# Patient Record
Sex: FEMALE | Race: WHITE
[De-identification: names, ages, dates, MRNs, and addresses within clinical notes are randomized per-mention and may not be internally consistent; named-entity substitution may affect disease eponyms.]

---

## 2017-12-02 ENCOUNTER — HOSPITAL ENCOUNTER (EMERGENCY)
Dept: HOSPITAL 62 - ER | Age: 22
LOS: 1 days | Discharge: HOME | End: 2017-12-03
Payer: OTHER GOVERNMENT
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
## 2017-12-02 DIAGNOSIS — F17.200: ICD-10-CM
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
## 2017-12-02 DIAGNOSIS — S61.210A: Primary | ICD-10-CM

## 2017-12-02 PROCEDURE — 99283 EMERGENCY DEPT VISIT LOW MDM: CPT

## 2017-12-03 VITALS — SYSTOLIC BLOOD PRESSURE: 118 MMHG | DIASTOLIC BLOOD PRESSURE: 72 MMHG

## 2017-12-03 NOTE — ER DOCUMENT REPORT
ED Wound





- General


Chief Complaint: Laceration


Stated Complaint: FINGER LACERATION


Time Seen by Provider: 12/03/17 02:08


Notes: 


Patient is a 22 year old female that comes emergency department for chief 

complaint of laceration to the right index finger.  She states that she was 

washing dishes and accidentally cut herself with a knife.  She denies any other 

injuries. She states her tetanus is UTD within 5 years. 





TRAVEL OUTSIDE OF THE [place] IN LAST 30 DAYS: No





- Related Data


Allergies/Adverse Reactions: 


 





No Known Allergies Allergy (Verified 12/03/17 00:31)


 











Past Medical History





- General


Information source: Patient





- Social History


Smoking Status: Current Some Day Smoker


Chew tobacco use (# tins/day): No


Frequency of alcohol use: Heavy


Drug Abuse: None


Lives with: Family


Family History: None - renal calculi


Patient has suicidal ideation: No


Patient has homicidal ideation: No





- Medical History


Medical History: Negative


Renal/ Medical History: Denies: Hx Peritoneal Dialysis


Surgical Hx: Negative





- Immunizations


Immunizations up to date: Yes


Hx Diphtheria, Pertussis, Tetanus Vaccination: Yes





Review of Systems





- Review of Systems


Constitutional: No symptoms reported


EENT: No symptoms reported


Cardiovascular: No symptoms reported


Respiratory: No symptoms reported


Gastrointestinal: No symptoms reported


Genitourinary: No symptoms reported


Female Genitourinary: No symptoms reported


Musculoskeletal: See HPI


Skin: See HPI


Hematologic/Lymphatic: No symptoms reported


Neurological/Psychological: No symptoms reported





Physical Exam





- Vital signs


Vitals: 


 











Temp Pulse Resp BP Pulse Ox


 


 97.7 F   102 H  16   118/72   100 


 


 12/03/17 01:37  12/03/17 01:37  12/03/17 01:37  12/03/17 01:37  12/03/17 01:37











Interpretation: Normal





- General


General appearance: Appears well, Alert


In distress: None





- HEENT


Head: Normocephalic, Atraumatic


Eyes: Normal


Pupils: PERRL





- Respiratory


Respiratory status: No respiratory distress


Chest status: Nontender


Breath sounds: Normal


Chest palpation: Normal





- Cardiovascular


Rhythm: Regular


Heart sounds: Normal auscultation


Murmur: No





- Abdominal


Inspection: Normal


Distension: No distension


Bowel sounds: Normal


Tenderness: Nontender


Organomegaly: No organomegaly





- Back


Back: Normal, Nontender





- Extremities


General upper extremity: Other - There is a 1.5 cm linear resolve the 

laceration over the side of the right index finger laterally, just before the 

DIP, normal range of motion of the finger, normal sensation and capillary 

refill.  Partial-thickness wound with no deep cut or evidence of tendon or 

nerve laceration


General lower extremity: Normal inspection, Nontender, Normal color, Normal ROM

, Normal temperature, Normal weight bearing





- Neurological


Neuro grossly intact: Yes


Cognition: Normal


Orientation: AAOx4


[place]rin Coma Scale Eye Opening: Spontaneous


[place] Coma Scale Verbal: Oriented


[place] Coma Scale Motor: Obeys Commands


[place] Coma Scale Total: 15


Speech: Normal


Motor strength normal: LUE, RUE, LLE, RLE


Sensory: Normal





- Psychological


Associated symptoms: Normal affect, Normal mood





- Skin


Skin Temperature: Warm


Skin Moisture: Dry


Skin Color: Normal





Course





- Re-evaluation


Re-evalutation: 


Laceration to the right index finger irrigated thoroughly, sutured, dressed.  

Discussed wound care, follow-up, return precautions.  Patient and  state 

understanding and agreement.








- Vital Signs


Vital signs: 


 











Temp Pulse Resp BP Pulse Ox


 


 97.7 F   102 H  16   118/72   100 


 


 12/03/17 01:37  12/03/17 01:37  12/03/17 01:37  12/03/17 01:37  12/03/17 01:37














Procedures





- Laceration/Wound Repair


  ** right index finger


Wound length (cm): 1.5


Wound's Depth, Shape: Irregular


Laceration pre-procedure: Sterile PPE donned, Sterile drapes applied, Shur-

Clens applied - surgical cleanser


Anesthetic type: 1% Lidocaine


Volume Anesthetic (mLs): 2


Wound explored: Clean, No foreign body removed


Irrigated w/ Saline (mLs): 40


Wound Repaired With: Sutures


Suture Size/Type: 5:0, Prolene


Number of Sutures: 5


Layer Closure?: No


Post-procedure wound care: Sterile dressing applied


Post-procedure NV exam normal: Yes


Complications: No





Discharge





- Discharge


Clinical Impression: 


Finger laceration


Qualifiers:


 Encounter type: initial encounter Finger: index finger Damage to nail status: 

without damage Foreign body presence: without foreign body Laterality: right 

Qualified Code(s): S61.210A - Laceration without foreign body of right index 

finger without damage to nail, initial encounter





Condition: Stable


Disposition: HOME, SELF-CARE


Additional Instructions: 


Sutures need to come out in about 7 days.  Keep clean, clean with soap and water

, dab dry, avoid soaking, you can apply thin film topical antibiotic and clean 

dressing.  Change about daily, more if it becomes dirty.





Return for any concerning symptoms including redness, swelling, discolored 

drainage, or any other concerning symptoms.

## 2018-04-05 ENCOUNTER — HOSPITAL ENCOUNTER (EMERGENCY)
Dept: HOSPITAL 62 - ER | Age: 23
LOS: 1 days | Discharge: HOME | End: 2018-04-06
Payer: OTHER GOVERNMENT

## 2018-04-05 DIAGNOSIS — R10.9: Primary | ICD-10-CM

## 2018-04-05 DIAGNOSIS — R10.31: ICD-10-CM

## 2018-04-05 DIAGNOSIS — Z87.442: ICD-10-CM

## 2018-04-05 PROCEDURE — 81001 URINALYSIS AUTO W/SCOPE: CPT

## 2018-04-05 PROCEDURE — 81025 URINE PREGNANCY TEST: CPT

## 2018-04-05 PROCEDURE — 85025 COMPLETE CBC W/AUTO DIFF WBC: CPT

## 2018-04-05 PROCEDURE — 74018 RADEX ABDOMEN 1 VIEW: CPT

## 2018-04-05 PROCEDURE — 96374 THER/PROPH/DIAG INJ IV PUSH: CPT

## 2018-04-05 PROCEDURE — 96361 HYDRATE IV INFUSION ADD-ON: CPT

## 2018-04-05 PROCEDURE — 96375 TX/PRO/DX INJ NEW DRUG ADDON: CPT

## 2018-04-05 PROCEDURE — 80053 COMPREHEN METABOLIC PANEL: CPT

## 2018-04-05 PROCEDURE — 36415 COLL VENOUS BLD VENIPUNCTURE: CPT

## 2018-04-05 PROCEDURE — 99284 EMERGENCY DEPT VISIT MOD MDM: CPT

## 2018-04-06 VITALS — SYSTOLIC BLOOD PRESSURE: 120 MMHG | DIASTOLIC BLOOD PRESSURE: 76 MMHG

## 2018-04-06 LAB
ADD MANUAL DIFF: NO
ALBUMIN SERPL-MCNC: 4.9 G/DL (ref 3.5–5)
ALP SERPL-CCNC: 47 U/L (ref 38–126)
ALT SERPL-CCNC: 37 U/L (ref 9–52)
ANION GAP SERPL CALC-SCNC: 13 MMOL/L (ref 5–19)
APPEARANCE UR: CLEAR
APTT PPP: (no result) S
AST SERPL-CCNC: 26 U/L (ref 14–36)
BASOPHILS # BLD AUTO: 0 10^3/UL (ref 0–0.2)
BASOPHILS NFR BLD AUTO: 0.5 % (ref 0–2)
BILIRUB DIRECT SERPL-MCNC: 0.3 MG/DL (ref 0–0.4)
BILIRUB SERPL-MCNC: 0.5 MG/DL (ref 0.2–1.3)
BILIRUB UR QL STRIP: NEGATIVE
BUN SERPL-MCNC: 18 MG/DL (ref 7–20)
CALCIUM: 10.5 MG/DL (ref 8.4–10.2)
CHLORIDE SERPL-SCNC: 102 MMOL/L (ref 98–107)
CO2 SERPL-SCNC: 27 MMOL/L (ref 22–30)
EOSINOPHIL # BLD AUTO: 0.1 10^3/UL (ref 0–0.6)
EOSINOPHIL NFR BLD AUTO: 1 % (ref 0–6)
ERYTHROCYTE [DISTWIDTH] IN BLOOD BY AUTOMATED COUNT: 12.9 % (ref 11.5–14)
GLUCOSE SERPL-MCNC: 88 MG/DL (ref 75–110)
GLUCOSE UR STRIP-MCNC: NEGATIVE MG/DL
HCT VFR BLD CALC: 39.2 % (ref 36–47)
HGB BLD-MCNC: 13.5 G/DL (ref 12–15.5)
KETONES UR STRIP-MCNC: NEGATIVE MG/DL
LYMPHOCYTES # BLD AUTO: 2.6 10^3/UL (ref 0.5–4.7)
LYMPHOCYTES NFR BLD AUTO: 29.9 % (ref 13–45)
MCH RBC QN AUTO: 32.5 PG (ref 27–33.4)
MCHC RBC AUTO-ENTMCNC: 34.6 G/DL (ref 32–36)
MCV RBC AUTO: 94 FL (ref 80–97)
MONOCYTES # BLD AUTO: 0.7 10^3/UL (ref 0.1–1.4)
MONOCYTES NFR BLD AUTO: 7.9 % (ref 3–13)
NEUTROPHILS # BLD AUTO: 5.3 10^3/UL (ref 1.7–8.2)
NEUTS SEG NFR BLD AUTO: 60.7 % (ref 42–78)
NITRITE UR QL STRIP: NEGATIVE
PH UR STRIP: 6 [PH] (ref 5–9)
PLATELET # BLD: 309 10^3/UL (ref 150–450)
POTASSIUM SERPL-SCNC: 4.2 MMOL/L (ref 3.6–5)
PROT SERPL-MCNC: 7.6 G/DL (ref 6.3–8.2)
PROT UR STRIP-MCNC: NEGATIVE MG/DL
RBC # BLD AUTO: 4.16 10^6/UL (ref 3.72–5.28)
SODIUM SERPL-SCNC: 142.1 MMOL/L (ref 137–145)
SP GR UR STRIP: 1.01
TOTAL CELLS COUNTED % (AUTO): 100 %
UROBILINOGEN UR-MCNC: NEGATIVE MG/DL (ref ?–2)
WBC # BLD AUTO: 8.8 10^3/UL (ref 4–10.5)

## 2018-04-06 NOTE — RADIOLOGY REPORT (SQ)
EXAM DESCRIPTION: KUB/ABDOMEN (SINGLE VIEW)



CLINICAL HISTORY:  right flank and abd pain; passing kidney stone



COMPARISON: 12/21/2016



FINDINGS: 



Bowel: No dilated loops of large or small bowel.

Peritoneum: No free intraperitoneal air identified.

Solid organs: No definite organomegaly.

Calcifications: No abnormal calcifications.

Bones: No acute osseous abnormalities.



Other: No additional findings.





IMPRESSION:

Nonobstructive bowel gas pattern. No abnormal calcifications

identified.

## 2018-04-06 NOTE — ER DOCUMENT REPORT
ED GI/





- General


Chief Complaint: Possible Kidney Stone


Stated Complaint: BACK PAIN


Time Seen by Provider: 04/06/18 02:12


Notes: 





Patient is a 22-year-old female that comes emergency department for chief 

complaint of pain in her right flank that radiates around to her right mid to 

lower abdomen, symptoms started earlier today, she states the pain in her flank 

is been constant but the pain in her lower abdomen is intermittent and sharp.  

She states she had nausea earlier, she denies vomiting, fever, dysuria.  She 

has a history of kidney stones, states this feels like she is passing one.  She 

denies any surgeries or daily medications, LMP 1 week ago.  She denies any 

other medical history.


TRAVEL OUTSIDE OF THE [place] IN LAST 30 DAYS: No





- Related Data


Allergies/Adverse Reactions: 


 





No Known Allergies Allergy (Verified 12/03/17 00:31)


 











Past Medical History





- General


Information source: Patient





- Social History


Smoking Status: Never Smoker


Frequency of alcohol use: None


Drug Abuse: None


Lives with: Family


Family History: None - renal calculi


Renal/ Medical History: Reports: Hx Kidney Stones.  Denies: Hx Peritoneal 

Dialysis


Surgical Hx: Negative





- Immunizations


Immunizations up to date: Yes


Hx Diphtheria, Pertussis, Tetanus Vaccination: Yes





Review of Systems





- Review of Systems


Constitutional: No symptoms reported


EENT: No symptoms reported


Cardiovascular: No symptoms reported


Respiratory: No symptoms reported


Gastrointestinal: See HPI


Genitourinary: See HPI


Female Genitourinary: No symptoms reported


Musculoskeletal: No symptoms reported


Skin: No symptoms reported


Hematologic/Lymphatic: No symptoms reported


Neurological/Psychological: No symptoms reported





Physical Exam





- Vital signs


Vitals: 


 











Temp Pulse Resp BP Pulse Ox


 


 98.2 F   114 H  18   134/82 H  98 


 


 04/05/18 22:57  04/05/18 22:57  04/05/18 22:57  04/05/18 22:57  04/05/18 22:57











Interpretation: Normal





- General


General appearance: Alert, Anxious


In distress: Mild - Patient appears somewhat uncomfortable





- HEENT


Head: Normocephalic, Atraumatic


Eyes: Normal


Pupils: PERRL





- Respiratory


Respiratory status: No respiratory distress


Chest status: Nontender


Breath sounds: Normal


Chest palpation: Normal





- Cardiovascular


Rhythm: Regular


Heart sounds: Normal auscultation


Murmur: No





- Abdominal


Inspection: Normal


Distension: No distension


Bowel sounds: Normal


Tenderness: Tender - There is mild mid to lower right abdominal tenderness, no 

guarding, no rigidity, no rebound tenderness


Organomegaly: No organomegaly





- Back


Back: Normal, Nontender





- Extremities


General upper extremity: Normal inspection, Nontender, Normal color, Normal ROM

, Normal temperature


General lower extremity: Normal inspection, Nontender, Normal color, Normal ROM

, Normal temperature, Normal weight bearing.  No: Barbara's sign





- Neurological


Neuro grossly intact: Yes


Cognition: Normal


Orientation: AAOx4


[place] Coma Scale Eye Opening: Spontaneous


[place] Coma Scale Verbal: Oriented


[place] Coma Scale Motor: Obeys Commands


[place] Coma Scale Total: 15


Speech: Normal


Motor strength normal: LUE, RUE, LLE, RLE


Sensory: Normal





- Psychological


Associated symptoms: Normal affect, Normal mood





- Skin


Skin Temperature: Warm


Skin Moisture: Dry


Skin Color: Normal





Course





- Re-evaluation


Re-evalutation: 


Patient does have some generalized right mid to lower abdominal pain on exam, 

no guarding, no obvious CVA tenderness.  Patient states her pain is in her 

right flank and intermittently in the right abdomen.  Suspicious for kidney 

stone.





KUB unremarkable.  This was performed after discussion with patient, she did 

decline a CAT scan because of the radiation.  She understands that this is not 

a thorough exam.  CBC, chemistry unremarkable, urinalysis unremarkable 

including no hematuria or infection.





Discussed with patient.  Her symptoms do suggest passage of a small stone, 

possibly not able to visualize on KUB.  Discussed possibility of appendicitis 

versus other acute intra-abdominal abnormalities, additional workup was 

declined.  Plan is to treat patient for suspected small stone with strict 

return precautions for development of appendicitis symptoms.  Patient and 

significant other state understanding and agreement with plan.





- Vital Signs


Vital signs: 


 











Temp Pulse Resp BP Pulse Ox


 


 98.9 F   91   16   120/76   98 


 


 04/06/18 04:43  04/06/18 04:43  04/06/18 04:43  04/06/18 04:43  04/06/18 04:43














- Laboratory


Result Diagrams: 


 04/06/18 02:39





 04/06/18 02:39


Laboratory results interpreted by me: 


 











  04/06/18 04/06/18





  01:00 02:39


 


Calcium   10.5 H


 


Urine Blood  SMALL H 














Discharge





- Discharge


Clinical Impression: 


 Lower abdominal pain, Right flank pain





Condition: Stable


Disposition: HOME, SELF-CARE


Additional Instructions: 


Your x-ray does not show any obvious stones or abnormalities.  Your workup does 

not show any obvious abnormalities.  Your examination and history are most 

suggestive of the passing a small stone on the right side, however this was not 

confirmed.  The passage of time should help confirm this.  If you worsen 

including developing pain specifically in your lower abdomen, vomiting, chills, 

swelling of the abdomen, or any other concerning or worsening symptoms please 

return to the emergency department immediately.  Otherwise take the medications 

as prescribed and follow-up with urology routinely.


Prescriptions: 


Ketorolac Tromethamine [Toradol 10 mg Tablet] 10 mg PO Q8HP PRN #24 tablet


 PRN Reason: 


Hydrocodone/Acetaminophen [Norco 5-325 mg Tablet] 1 - 2 tab PO ASDIR #12 tablet


Ondansetron [Zofran Odt 4 mg Tablet] 1 - 2 tab PO Q4H PRN #15 tab.rapdis


 PRN Reason: For Nausea/Vomiting

## 2018-12-09 ENCOUNTER — HOSPITAL ENCOUNTER (EMERGENCY)
Dept: HOSPITAL 62 - ER | Age: 23
Discharge: HOME | End: 2018-12-09
Payer: OTHER GOVERNMENT

## 2018-12-09 VITALS — DIASTOLIC BLOOD PRESSURE: 85 MMHG | SYSTOLIC BLOOD PRESSURE: 122 MMHG

## 2018-12-09 DIAGNOSIS — F17.200: ICD-10-CM

## 2018-12-09 DIAGNOSIS — R10.9: Primary | ICD-10-CM

## 2018-12-09 DIAGNOSIS — G89.29: ICD-10-CM

## 2018-12-09 LAB
ANION GAP SERPL CALC-SCNC: 11 MMOL/L (ref 5–19)
APPEARANCE UR: (no result)
APTT PPP: YELLOW S
BILIRUB UR QL STRIP: NEGATIVE
BUN SERPL-MCNC: 13 MG/DL (ref 7–20)
CALCIUM: 9 MG/DL (ref 8.4–10.2)
CHLORIDE SERPL-SCNC: 105 MMOL/L (ref 98–107)
CO2 SERPL-SCNC: 25 MMOL/L (ref 22–30)
GLUCOSE SERPL-MCNC: 89 MG/DL (ref 75–110)
GLUCOSE UR STRIP-MCNC: NEGATIVE MG/DL
KETONES UR STRIP-MCNC: NEGATIVE MG/DL
NITRITE UR QL STRIP: NEGATIVE
PH UR STRIP: 7 [PH] (ref 5–9)
POTASSIUM SERPL-SCNC: 4.7 MMOL/L (ref 3.6–5)
PROT UR STRIP-MCNC: NEGATIVE MG/DL
SODIUM SERPL-SCNC: 140.5 MMOL/L (ref 137–145)
SP GR UR STRIP: 1.02
UROBILINOGEN UR-MCNC: NEGATIVE MG/DL (ref ?–2)

## 2018-12-09 PROCEDURE — 81001 URINALYSIS AUTO W/SCOPE: CPT

## 2018-12-09 PROCEDURE — 99284 EMERGENCY DEPT VISIT MOD MDM: CPT

## 2018-12-09 PROCEDURE — 96374 THER/PROPH/DIAG INJ IV PUSH: CPT

## 2018-12-09 PROCEDURE — 74176 CT ABD & PELVIS W/O CONTRAST: CPT

## 2018-12-09 PROCEDURE — 96361 HYDRATE IV INFUSION ADD-ON: CPT

## 2018-12-09 PROCEDURE — 84703 CHORIONIC GONADOTROPIN ASSAY: CPT

## 2018-12-09 PROCEDURE — 80048 BASIC METABOLIC PNL TOTAL CA: CPT

## 2018-12-09 PROCEDURE — 36415 COLL VENOUS BLD VENIPUNCTURE: CPT

## 2018-12-09 PROCEDURE — 96375 TX/PRO/DX INJ NEW DRUG ADDON: CPT

## 2018-12-09 RX ADMIN — MORPHINE SULFATE PRN MG: 10 INJECTION INTRAMUSCULAR; INTRAVENOUS; SUBCUTANEOUS at 19:09

## 2018-12-09 RX ADMIN — MORPHINE SULFATE PRN MG: 10 INJECTION INTRAMUSCULAR; INTRAVENOUS; SUBCUTANEOUS at 21:10

## 2018-12-09 NOTE — ER DOCUMENT REPORT
ED General





- General


Chief Complaint: Possible Kidney Stone


Stated Complaint: FLANK PAIN


Time Seen by Provider: 12/09/18 18:35


Notes: 





Patient is a 23-year-old female with chronic medical history of anxiety, 

recurrent right flank pain who presents with approximately 12 hours of right 

flank pain.  She states it is a throbbing, aching, constant pain worsened by 

standing for prolonged periods of time.  She states only and that relieves it 

is laying flat in a bed.  She states that she gets this pain naproxen once 

every 2 weeks usually after standing for prolonged period of time or 

exercising.  She reports a history of kidney stones although has never 

witnessed passage of a stone, has never had a CT of her abdomen and pelvis, has 

no known confirmation of having had a kidney stone definitively in the past.  

She denies any associated vomiting, fever or constitutional symptoms.  No 

abdominal pain.  Nothing improves her symptoms.  She has not contacted her 

general physician regarding today's concerns.





- Related Data


Allergies/Adverse Reactions: 


 





No Known Allergies Allergy (Verified 12/03/17 00:31)


 











Past Medical History





- General


Information source: Patient





- Social History


Smoking Status: Current Every Day Smoker


Chew tobacco use (# tins/day): No


Frequency of alcohol use: Social


Drug Abuse: None


Lives with: Spouse/Significant other


Family History: Reviewed & Not Pertinent


Patient has suicidal ideation: No


Patient has homicidal ideation: No


Renal/ Medical History: Reports: Hx Kidney Stones.  Denies: Hx Peritoneal 

Dialysis





- Immunizations


Immunizations up to date: Yes


Hx Diphtheria, Pertussis, Tetanus Vaccination: Yes





Review of Systems





- Review of Systems


Notes: 





Constitutional: Negative for fever.


HENT: Negative for sore throat.


Eyes: Negative for visual changes.


Cardiovascular: Negative for chest pain.


Respiratory: Negative for shortness of breath.


Gastrointestinal: Positive for right flank pain and nausea


Genitourinary: Negative for dysuria


Musculoskeletal: Negative for back pain.


Skin: Negative for rash.


Neurological: Negative for headaches, weakness or numbness.





10 point ROS negative except as marked above and in HPI.





Physical Exam





- Vital signs


Vitals: 


 











Temp Pulse Resp BP Pulse Ox


 


 99.7 F   87   18   123/81   98 


 


 12/09/18 18:02  12/09/18 18:02  12/09/18 18:02  12/09/18 18:02  12/09/18 18:02











Interpretation: Normal


Notes: 





PHYSICAL EXAMINATION:





GENERAL: Well-appearing, well-nourished and in no acute distress.





HEAD: Atraumatic, normocephalic.





EYES: Pupils equal round and reactive to light, extraocular movements intact, 

sclera anicteric, conjunctiva are normal.





ENT: nares patent, oropharynx clear without exudates.  Moist mucous membranes.





NECK: Normal range of motion, supple without lymphadenopathy





LUNGS: Breath sounds clear to auscultation bilaterally and equal.  No wheezes 

rales or rhonchi.





HEART: Regular rate and rhythm without murmurs





ABDOMEN: Soft, mild right flank tenderness to palpation otherwise no abdominal 

or flank tenderness on palpation, normoactive bowel sounds.  No guarding, no 

rebound.  No masses appreciated.





EXTREMITIES: Normal range of motion, no pitting or edema.  No cyanosis.





NEUROLOGICAL: No focal neurological deficits. Moves all extremities 

spontaneously and on command.





PSYCH: Normal mood, normal affect.





SKIN: Warm, Dry, normal turgor, no rashes or lesions noted.





Course





- Re-evaluation


Re-evalutation: 





12/09/18 19:51


Patient presents with chronic recurrent right flank pain.  On exam she only has 

localized right flank tenderness on palpation but no other abdominal 

tenderness.  The patient reports a history of kidney stones based on 2 previous 

visits to the emergency department but of note on these occasions the patient 

never had hematuria and only had a questionable calcification on initial KUB in 

2016.  The recurrent nature of her symptoms as well as her characterization of 

the pain, the fact that lying still in a bed improves her symptoms all bring 

significant questions about the validity of the diagnosis of nephrolithiasis.  

We have therefore elected to proceed with CT scan of the abdomen and pelvis to 

further clarify.  I do suspect that this is more likely musculoskeletal in 

origin.  I have expressed this to the patient and her  and we have 

agreed that if the CT is normal, labs are unremarkable that she will require 

outpatient evaluation for her ongoing recurrent right flank pain.


12/09/18 20:12


CT is normal without any evidence of kidney stone or other acute pathology.  

All labs unremarkable.  Patient symptomatically improved.  Repeat abdominal 

exam remains benign.  At this time will discharge with return precautions and 

follow-up recommendations.  Verbal discharge instructions given a the bedside 

and opportunity for questions given. Medication warnings reviewed. Patient is 

in agreement with this plan and has verbalized understanding of return 

precautions and the need for primary care follow-up in the next 24-72 hours.





- Vital Signs


Vital signs: 


 











Temp Pulse Resp BP Pulse Ox


 


 99.7 F   87   18   123/81   98 


 


 12/09/18 18:02  12/09/18 18:02  12/09/18 18:02  12/09/18 18:02  12/09/18 18:02














- Laboratory


Result Diagrams: 


 12/09/18 19:00


Laboratory results interpreted by me: 


 











  12/09/18





  19:00


 


Urine Blood  SMALL H


 


Ur Leukocyte Esterase  LARGE H














- Diagnostic Test


Radiology reviewed: Reports reviewed





Discharge





- Discharge


Clinical Impression: 


 Right flank pain, Chronic flank pain





Condition: Good


Disposition: HOME, SELF-CARE


Additional Instructions: 


Your CT scan and labs are normal today. The exact cause of your recurrent flank 

pain is uncertain but does appear to be most likely related to the muscles of 

your back. You do not have kidney stones or evidence of a kidney infection.  

For your pain: Take ibuprofen 600 mg and acetaminophen 1000 mg every 6 hours 

together as needed for pain.  Apply heat to the area regularly. Follow-up with 

your general doctor for consideration of PT referral. Return if you develop 

fever, vomiting, pass out, or have any other symptoms that are concerning to 

you.

## 2018-12-09 NOTE — RADIOLOGY REPORT (SQ)
EXAM DESCRIPTION:  CT ABD/PELVIS NO ORAL OR IV



COMPLETED DATE/TIME:  12/9/2018 7:57 pm



REASON FOR STUDY:  right flank pain



COMPARISON:  None.



TECHNIQUE:  CT scan of the abdomen and pelvis performed without intravenous or oral contrast. Images 
reviewed with lung, soft tissue, and bone windows. Reconstructed coronal and sagittal MPR images revi
ewed. All images stored on PACS.

All CT scanners at this facility use dose modulation, iterative reconstruction, and/or weight based d
osing when appropriate to reduce radiation dose to as low as reasonably achievable (ALARA).

[facility]: Dose Right  [facility]: CareDose    [facility]: Dose Right    [facility]: Teradose 4D    [facility]: Smart [employer]



RADIATION DOSE:  CT Rad equipment meets quality standard of care and radiation dose reduction techniq
ues were employed. CTDIvol: 9.1 mGy. DLP: 477 mGy-cm.mGy.



LIMITATIONS:  None.



FINDINGS:  LOWER CHEST: No significant findings. No nodules or infiltrates.

NON-CONTRASTED LIVER, SPLEEN, ADRENALS: Evaluation limited by lack of IV contrast. No identified sign
ificant masses.

PANCREAS: No masses. No peripancreatic inflammatory changes.

GALLBLADDER: No identified stones by CT criteria. No inflammatory changes to suggest cholecystitis.

RIGHT KIDNEY AND URETER: No suspicious masses. Assessment limited by lack of IV contrast.   No signif
icant calcifications.   No hydronephrosis or hydroureter.

LEFT KIDNEY AND URETER: No suspicious masses. Assessment limited by lack of IV contrast.   No signifi
cant calcifications.   No hydronephrosis or hydroureter.

AORTA AND RETROPERITONEUM: No aneurysm. No retroperitoneal masses or adenopathy.

BOWEL AND PERITONEAL CAVITY: No obvious masses or inflammatory changes. No free fluid.

APPENDIX: Normal.

PELVIS, BLADDER, AND ABDOMINAL WALL:No abnormal masses. No free fluid. Bladder normal.

BONES: No significant findings.

OTHER: No other significant finding.



IMPRESSION:  NO SIGNIFICANT OR ACUTE PROCESS IN THE ABDOMEN OR PELVIS.



COMMENT:  Quality ID # [id number]: Final reports with documentation of one or more dose reduction techniques
 (e.g., Automated exposure control, adjustment of the mA and/or kV according to patient size, use of 
iterative reconstruction technique)



TECHNICAL DOCUMENTATION:  JOB ID:  [id number]

 2011 [employer]- All Rights Reserved



Reading location - IP/workstation name: [email or web address]

## 2018-12-24 ENCOUNTER — HOSPITAL ENCOUNTER (EMERGENCY)
Dept: HOSPITAL 62 - ER | Age: 23
LOS: 1 days | Discharge: HOME | End: 2018-12-25
Payer: OTHER GOVERNMENT

## 2018-12-24 DIAGNOSIS — R39.11: ICD-10-CM

## 2018-12-24 DIAGNOSIS — R31.9: ICD-10-CM

## 2018-12-24 DIAGNOSIS — Z87.442: ICD-10-CM

## 2018-12-24 DIAGNOSIS — R10.9: Primary | ICD-10-CM

## 2018-12-24 DIAGNOSIS — F17.200: ICD-10-CM

## 2018-12-24 LAB
ADD MANUAL DIFF: NO
ALBUMIN SERPL-MCNC: 4.7 G/DL (ref 3.5–5)
ALP SERPL-CCNC: 81 U/L (ref 38–126)
ALT SERPL-CCNC: 21 U/L (ref 9–52)
ANION GAP SERPL CALC-SCNC: 13 MMOL/L (ref 5–19)
AST SERPL-CCNC: 26 U/L (ref 14–36)
BASOPHILS # BLD AUTO: 0.1 10^3/UL (ref 0–0.2)
BASOPHILS NFR BLD AUTO: 0.6 % (ref 0–2)
BILIRUB DIRECT SERPL-MCNC: 0.2 MG/DL (ref 0–0.4)
BILIRUB SERPL-MCNC: 0.2 MG/DL (ref 0.2–1.3)
BUN SERPL-MCNC: 13 MG/DL (ref 7–20)
CALCIUM: 9.8 MG/DL (ref 8.4–10.2)
CHLORIDE SERPL-SCNC: 107 MMOL/L (ref 98–107)
CO2 SERPL-SCNC: 23 MMOL/L (ref 22–30)
EOSINOPHIL # BLD AUTO: 0 10^3/UL (ref 0–0.6)
EOSINOPHIL NFR BLD AUTO: 0.3 % (ref 0–6)
ERYTHROCYTE [DISTWIDTH] IN BLOOD BY AUTOMATED COUNT: 13.5 % (ref 11.5–14)
GLUCOSE SERPL-MCNC: 118 MG/DL (ref 75–110)
HCT VFR BLD CALC: 41.2 % (ref 36–47)
HGB BLD-MCNC: 14.2 G/DL (ref 12–15.5)
LYMPHOCYTES # BLD AUTO: 2.5 10^3/UL (ref 0.5–4.7)
LYMPHOCYTES NFR BLD AUTO: 23.6 % (ref 13–45)
MCH RBC QN AUTO: 32.6 PG (ref 27–33.4)
MCHC RBC AUTO-ENTMCNC: 34.3 G/DL (ref 32–36)
MCV RBC AUTO: 95 FL (ref 80–97)
MONOCYTES # BLD AUTO: 0.6 10^3/UL (ref 0.1–1.4)
MONOCYTES NFR BLD AUTO: 5.7 % (ref 3–13)
NEUTROPHILS # BLD AUTO: 7.4 10^3/UL (ref 1.7–8.2)
NEUTS SEG NFR BLD AUTO: 69.8 % (ref 42–78)
PLATELET # BLD: 368 10^3/UL (ref 150–450)
POTASSIUM SERPL-SCNC: 4.7 MMOL/L (ref 3.6–5)
PROT SERPL-MCNC: 7.3 G/DL (ref 6.3–8.2)
RBC # BLD AUTO: 4.34 10^6/UL (ref 3.72–5.28)
SODIUM SERPL-SCNC: 142.5 MMOL/L (ref 137–145)
TOTAL CELLS COUNTED % (AUTO): 100 %
WBC # BLD AUTO: 10.6 10^3/UL (ref 4–10.5)

## 2018-12-24 PROCEDURE — 76770 US EXAM ABDO BACK WALL COMP: CPT

## 2018-12-24 PROCEDURE — 85025 COMPLETE CBC W/AUTO DIFF WBC: CPT

## 2018-12-24 PROCEDURE — 81001 URINALYSIS AUTO W/SCOPE: CPT

## 2018-12-24 PROCEDURE — 96374 THER/PROPH/DIAG INJ IV PUSH: CPT

## 2018-12-24 PROCEDURE — 96376 TX/PRO/DX INJ SAME DRUG ADON: CPT

## 2018-12-24 PROCEDURE — 96375 TX/PRO/DX INJ NEW DRUG ADDON: CPT

## 2018-12-24 PROCEDURE — 80053 COMPREHEN METABOLIC PANEL: CPT

## 2018-12-24 PROCEDURE — 81025 URINE PREGNANCY TEST: CPT

## 2018-12-24 PROCEDURE — 36415 COLL VENOUS BLD VENIPUNCTURE: CPT

## 2018-12-24 PROCEDURE — 99284 EMERGENCY DEPT VISIT MOD MDM: CPT

## 2018-12-25 VITALS — DIASTOLIC BLOOD PRESSURE: 80 MMHG | SYSTOLIC BLOOD PRESSURE: 115 MMHG

## 2018-12-25 LAB
APPEARANCE UR: CLEAR
APTT PPP: YELLOW S
BILIRUB UR QL STRIP: NEGATIVE
GLUCOSE UR STRIP-MCNC: NEGATIVE MG/DL
KETONES UR STRIP-MCNC: NEGATIVE MG/DL
NITRITE UR QL STRIP: NEGATIVE
PH UR STRIP: 6 [PH] (ref 5–9)
PROT UR STRIP-MCNC: NEGATIVE MG/DL
SP GR UR STRIP: 1.01
UROBILINOGEN UR-MCNC: NEGATIVE MG/DL (ref ?–2)

## 2018-12-25 NOTE — RADIOLOGY REPORT (SQ)
EXAM DESCRIPTION: 



US RETROPERITONEUM



COMPLETED DATE/TME:  12/24/2018 23:48



CLINICAL HISTORY: 



23 years, Female, right flank pain



COMPARISON:

CT 12/9/2018



TECHNIQUE:

Transverse and longitudinal sonographic images of the kidneys and

urinary bladder



LIMITATIONS:

None.



FINDINGS:



Right kidney has maximal diameter of 10.7 cm, the left of 10.9

cm. No renal calculus, mass, or hydronephrosis. No perinephric

fluid collection. Cortical medullary differentiation preserved

bilaterally. Limited evaluation of the urinary bladder is

unremarkable. Bilateral ureteral jets are present



IMPRESSION:



Unremarkable exam

 



copyright 2011 [employer]- All Rights Reserved

## 2018-12-25 NOTE — ER DOCUMENT REPORT
ED General





- General


Chief Complaint: Possible Kidney Stone


Stated Complaint: FLANK PAIN


Time Seen by Provider: 12/24/18 23:19


Notes: 





Patient is a 23-year-old female presents to the emergency department general 

complaining of right flank pain.  Patient states patient's right flank pain 

started a couple of hours ago.  Patient states she has a history of kidney 

stones on her right side and it feels exactly like a kidney stone, sharp and 

intermittent in nature.  Patient is admitting to some urinary hesitancy but she 

is denying dysuria.  Patient is denying any vaginal discharge.  Patient is 

denying any nausea, vomiting, diarrhea, fever, abdominal pain.





Past medical history: Kidney stones, anxiety


Medications: Prozac, trazodone


Allergies: None


TRAVEL OUTSIDE OF THE [place] IN LAST 30 DAYS: No





- Related Data


Allergies/Adverse Reactions: 


                                        





No Known Allergies Allergy (Verified 12/03/17 00:31)


   











Past Medical History





- General


Information source: Patient





- Social History


Smoking Status: Current Every Day Smoker


Family History: Reviewed & Not Pertinent


Patient has suicidal ideation: No


Patient has homicidal ideation: No


Renal/ Medical History: Reports: Hx Kidney Stones.  Denies: Hx Peritoneal 

Dialysis





- Immunizations


Immunizations up to date: Yes


Hx Diphtheria, Pertussis, Tetanus Vaccination: Yes





Review of Systems





- Review of Systems


Constitutional: See HPI


EENT: No symptoms reported


Cardiovascular: No symptoms reported


Respiratory: No symptoms reported


Gastrointestinal: See HPI


Genitourinary: See HPI


Female Genitourinary: No symptoms reported


Musculoskeletal: See HPI


Skin: No symptoms reported


Hematologic/Lymphatic: No symptoms reported


Neurological/Psychological: No symptoms reported





Physical Exam





- Vital signs


Vitals: 





                                        











Temp Pulse Resp BP Pulse Ox


 


 98.4 F   128 H  16   119/98 H  97 


 


 12/24/18 23:08  12/24/18 23:08  12/24/18 23:08  12/24/18 23:08  12/24/18 23:08














- Notes


Notes: 





GENERAL: Alert, interacts well. No acute distress.


HEAD: Normocephalic, atraumatic.


EYES: Pupils equal, round, and reactive to light. Extraocular movements intact.


ENT: Oral mucosa moist, tongue midline. 


NECK: Full range of motion. Supple. Trachea midline.


LUNGS: Clear to auscultation bilaterally, no wheezes, rales, or rhonchi. No 

respiratory distress.


HEART: Regular rate and rhythm. No murmur


ABDOMEN: Soft, non-tender. Non-distended. Bowel sounds present in all 4 q

uadrants.  Minor suprapubic tenderness


EXTREMITIES: Moves all 4 extremities spontaneously. No edema, normal radial and 

dorsalis pedis pulses bilaterally. No cyanosis.


BACK: no cervical, thoracic, lumbar midline tenderness. No saddle anesthesia, 

normal distal neurovascular exam.  Generalized right CVA tenderness.  No left 

CVA tenderness


NEUROLOGICAL: Alert and oriented x3. Normal speech. cranial nerves II through 

XII grossly intact 


PSYCH: Normal affect, normal mood.


SKIN: Warm, dry, normal turgor. No rashes or lesions noted.








Course





- Re-evaluation


Re-evalutation: 





12/25/18 02:49


Patient's ultrasound shows no signs of hydronephrosis, or no obvious kidney 

stones.  Patient has no signs of urinary tract infection on her urine but does 

show signs of blood, likely to have passed a stone or going to pass a stone.  

Patient has a very minor leukocytosis of 10.6, no signs of anemia, no signs of 

electrolyte abnormalities.  I have discussed patient's ultrasound and lab 

results with her at bedside.  Discussed possibility that she has passed a stone 

or is going to pass a stone.  Patient states treatments in the emergency room ha

ve for the most part relieved her pain.  States she has very minor pain 

intermittently in the right flank area.


Patient has never been seen by a nephrologist or urologist for kidney stones, 

[place].  Patient states she does not 

wish for referral because [place].





Patient is afebrile, non-tachycardic, non-hypotensive, stable for discharge at 

this time.





- Vital Signs


Vital signs: 





                                        











Temp Pulse Resp BP Pulse Ox


 


 98.4 F   128 H  16   119/98 H  97 


 


 12/24/18 23:08  12/24/18 23:08  12/24/18 23:08  12/24/18 23:08  12/24/18 23:08














- Laboratory


Result Diagrams: 


                                 12/24/18 23:25





                                 12/24/18 23:25


Laboratory results interpreted by me: 





                                        











  12/24/18 12/24/18 12/25/18





  23:25 23:25 02:14


 


WBC  10.6 H  


 


Glucose   118 H 


 


Urine Blood    SMALL H














Discharge





- Discharge


Clinical Impression: 


 Right flank pain





Condition: Stable


Disposition: HOME, SELF-CARE


Instructions:  Oral Narcotic Medication ([facility]), Flank Pain ([facility]), Kidney Stone 

([facility]), Antinausea Medication ([facility])


Additional Instructions: 


As we discussed you have been seen and treated in the department for potential 

kidney stones.  Your ultrasound shows no signs of fluid around your kidney.  

Your lab reveals that your kidney function is within normal limits.  Your urine 

shows no signs of infection but does show signs of blood.  This could be because

you are going to pass a kidney stone or already passed a kidney stone.  Please 

take medications as prescribed.  Please drink plenty of fluids and stay well-

hydrated.  Please return to the emergency room for any other concerning 

symptoms.  As we discussed it would be valencia of you to Follow-up with your 

primary care provider to get an appointment with a nephrologist due to your 

continued kidney stones.


Prescriptions: 


Morphine Sulfate [Morphine Ir 15 Mg Tablet] 15 mg PO Q4H PRN #20 tablet


 PRN Reason: 


Ondansetron [Zofran Odt 4 mg Tablet] 1 - 2 tab PO Q4H PRN #15 tab.rapdis


 PRN Reason: For Nausea/Vomiting